# Patient Record
(demographics unavailable — no encounter records)

---

## 2024-10-30 NOTE — DISCUSSION/SUMMARY
[FreeTextEntry1] : continue metformin increase atorvastatin to 80 mg po qhs to get to goal  get 2 d echo  f/u in 4 months/bloodwork  [EKG obtained to assist in diagnosis and management of assessed problem(s)] : EKG obtained to assist in diagnosis and management of assessed problem(s)

## 2024-10-30 NOTE — PHYSICAL EXAM
[Normal Venous Pressure] : normal venous pressure [Normal S1, S2] : normal S1, S2 [Clear Lung Fields] : clear lung fields [Soft] : abdomen soft [No Edema] : no edema [de-identified] : rrr

## 2024-10-30 NOTE — PHYSICAL EXAM
[Normal Venous Pressure] : normal venous pressure [Normal S1, S2] : normal S1, S2 [Clear Lung Fields] : clear lung fields [Soft] : abdomen soft [No Edema] : no edema [de-identified] : rrr

## 2024-10-30 NOTE — HISTORY OF PRESENT ILLNESS
[FreeTextEntry1] : Pt with DM, HLD, hypothyroidism came for cv evaluation as wife huber had valve surgery. no family h/o MI   a1c: 9.8 in  to 6.3 in   TC: 166 LDL  113 HDL 41 T  pt says mother has enlarged heart but in [place]. pt denies cp or sob, pt plays soccer 3 times/week with no issues.  pt denies syncope or lightheadedness.  pt denies pnd or orthopnea. pt says has some burning in chest once a year and resolved with water.  pt says taking atorvastatin 40 mg po qhs was taking it in day.

## 2025-02-12 NOTE — PHYSICAL EXAM
[Normal] : mucosa is normal [Midline] : trachea located in midline position [de-identified] : L jaw tenderness

## 2025-02-12 NOTE — HISTORY OF PRESENT ILLNESS
[de-identified] : 54-year-old male presents for an initial evaluation on left side facial pain.   When swallowing has left side jaw pain, pain also triggered by opening jaw.  Has been having pain for 4-5 months. Has discomfort when chewing. He denies any  facial  swelling. Seeing dentist today.  Denies nasal congestion or throat complaints.

## 2025-02-12 NOTE — ASSESSMENT
[FreeTextEntry1] : Pt was recommended a soft diet, warm compresses, and anti-inflammatories. Pt should follow up if symptoms persist.

## 2025-03-19 NOTE — DISCUSSION/SUMMARY
[FreeTextEntry1] : continue metformin continue atorvastatin 80mg  get copy of labs ? [facility] patient's wife is nurse advised to check BP daily and keep log  patient advised if BP staying above 140/90 to call and start meds blood work/4 months

## 2025-03-19 NOTE — HISTORY OF PRESENT ILLNESS
[FreeTextEntry1] : Pt with DM, HLD, hypothyroidism came for cv evaluation as wife huber had valve surgery. no family h/o MI   a1c: 9.8 in  to 6.3 in   TC: 166 LDL  113 HDL 41 T  pt says mother has enlarged heart but in [place]. pt denies cp or sob, pt plays soccer 3 times/week with no issues.  pt denies syncope or lightheadedness.  pt denies pnd or orthopnea. pt says has some burning in chest once a year and resolved with water.  pt says taking atorvastatin 40 mg po qhs was taking it in day.   3/12/25: Patient got blood work done with Dr Gilles Reich increased last visit  25: EF: 57%, Interatrial septum is aneurysmal. The patient denies CP, bleeding, SOB at rest, PND, abdominal discomfort, LH/dizziness, BLE edema, palpitations, and syncope.

## 2025-03-19 NOTE — PHYSICAL EXAM
[Normal Venous Pressure] : normal venous pressure [Normal S1, S2] : normal S1, S2 [Clear Lung Fields] : clear lung fields [Soft] : abdomen soft [No Edema] : no edema [de-identified] : rrr

## 2025-03-19 NOTE — DISCUSSION/SUMMARY
[FreeTextEntry1] : continue metformin continue atorvastatin 80mg  get copy of labs ? [facility] patient's wife is nurse advised to check BP daily and keep log  patient advised if BP staying above 140/90 to call and start meds blood work/4 months

## 2025-03-19 NOTE — PHYSICAL EXAM
[Normal Venous Pressure] : normal venous pressure [Normal S1, S2] : normal S1, S2 [Clear Lung Fields] : clear lung fields [Soft] : abdomen soft [No Edema] : no edema [de-identified] : rrr

## 2025-07-17 NOTE — DISCUSSION/SUMMARY
[EKG obtained to assist in diagnosis and management of assessed problem(s)] : EKG obtained to assist in diagnosis and management of assessed problem(s) [FreeTextEntry1] : continue metformin f/u with DM  continue atorvastatin 80mg  get copy of labs ? [facility] patient's wife is nurse advised to check BP daily and keep log  patient advised if BP staying above 140/90 to call and start meds blood work/4 months

## 2025-07-17 NOTE — HISTORY OF PRESENT ILLNESS
[FreeTextEntry1] : Pt with DM, HLD, hypothyroidism came for cv evaluation as wife recently had valve surgery. no family h/o MI   a1c: 9.8 in  to 6.3 in   TC: 166 LDL  113 HDL 41 T  pt says mother has enlarged heart but in [place]. pt denies cp or sob, pt plays soccer 3 times/week with no issues.  pt denies syncope or lightheadedness.  pt denies pnd or orthopnea. pt says has some burning in chest once a year and resolved with water.  pt says taking atorvastatin 40 mg po qhs was taking it in day.   3/12/25: Patient got blood work done with Dr Gilles Reich increased last visit  25: EF: 57%, Interatrial septum is aneurysmal. TRUDY, lvsd: 1.2 cm, DD1, E wave: 0.51 m/s,  lvot vti: 19.97 cm  The patient denies CP, bleeding, SOB at rest, PND, abdominal discomfort, LH/dizziness, BLE edema, palpitations, and syncope.  25:  25: EF: 57%, Interatrial septum is aneurysmal. TRUDY, lvsd: 1.2 cm, DD1, E wave: 0.51 m/s,  lvot vti: 19.97 cm  pt feels well with no cp or sob, pt plays soccer and a midfielder with no issues. pt deneis cp or sob.  pt not drinking much water, pt works as a taxi job.  7/15/25: HDL: 30, T, LDL: 123, A1C: 9.9

## 2025-07-17 NOTE — PHYSICAL EXAM
[Normal Venous Pressure] : normal venous pressure [Normal S1, S2] : normal S1, S2 [Clear Lung Fields] : clear lung fields [Soft] : abdomen soft [No Edema] : no edema [de-identified] : rrr

## 2025-07-28 NOTE — HISTORY OF PRESENT ILLNESS
[FreeTextEntry1] : Pt returns today following up on facial pain, TMJ disorder. Reports that he used Meloxicam with some improvement. Still having jaw pain on left side when opening his mouth widely. Dentist told him there is no issues with his teeth. No further complaints or concerns.

## 2025-07-28 NOTE — PHYSICAL EXAM
[Normal] : mucosa is normal [Midline] : trachea located in midline position [de-identified] : L TMJ tenderness

## 2025-07-28 NOTE — REASON FOR VISIT
[Subsequent Evaluation] : a subsequent evaluation for [FreeTextEntry2] : facial pain, TMJ disorder.